# Patient Record
Sex: FEMALE | Race: WHITE | NOT HISPANIC OR LATINO | Employment: FULL TIME | URBAN - METROPOLITAN AREA
[De-identification: names, ages, dates, MRNs, and addresses within clinical notes are randomized per-mention and may not be internally consistent; named-entity substitution may affect disease eponyms.]

---

## 2024-11-13 ENCOUNTER — PATIENT MESSAGE (OUTPATIENT)
Dept: SLEEP MEDICINE | Facility: HOSPITAL | Age: 29
End: 2024-11-13
Payer: COMMERCIAL

## 2024-11-14 ENCOUNTER — HOSPITAL ENCOUNTER (OUTPATIENT)
Dept: SLEEP MEDICINE | Facility: HOSPITAL | Age: 29
Discharge: HOME | End: 2024-11-14
Attending: PSYCHIATRY & NEUROLOGY
Payer: COMMERCIAL

## 2024-11-14 DIAGNOSIS — G25.3 MYOCLONIA: ICD-10-CM

## 2024-11-14 PROCEDURE — 95810 POLYSOM 6/> YRS 4/> PARAM: CPT

## 2024-11-15 PROBLEM — G25.3 MYOCLONIA: Status: ACTIVE | Noted: 2024-11-15

## 2024-12-03 ENCOUNTER — TELEPHONE (OUTPATIENT)
Dept: PULMONOLOGY | Facility: CLINIC | Age: 29
End: 2024-12-03
Payer: COMMERCIAL

## 2024-12-05 NOTE — PROGRESS NOTES
Pulmonary Outpatient  Visit     Subjective:       Patient ID: Tabby Harris is a 29 y.o. female.    Social History     Tobacco Use   Smoking Status Never   Smokeless Tobacco Never            Chief Complaint: sleep results          Tabby Harris is 29 y.o.  Referred by Dr Zheng  Spouse present for visit   Study reviewed with patient   Study was negative for obstructive sleep apnea   Some slight arousals post arousals were seen during sleep throughout the night   Longstanding history reviewed with the patient   Beulah sleepiness score 5   Bedtime 9:00 p.m. wake-up time 6:20 a.m.   Works as a teacher at [employer]   Longstanding history of grand mal seizure as a child  As an adult myoclonic seizures   Treated with Keppra and was often 2019   Recent repeated episodes of what looked like myoclonic jerks   Sisters obstructive sleep apnea the   The were concerned about sleep apnea inpatient   Sleep latency 10-15 minutes   Denies sleep attacks   Denies any signs later in the narcolepsy   Denies any teeth grinding   Finds that the jerking activities are worse with stress caffeine  Naps for 120-180 minutes   Noteworthy stress   Patient attests to have mild snoring or nocturnal diaphoresis   Occasional dry mouth at night   Denies any daytime sleepiness              Review of Systems   Constitutional: Negative.    Respiratory:  Negative for apnea, snoring, shortness of breath, dyspnea on extertion and somnolence.        Outpatient Encounter Medications as of 12/6/2024   Medication Sig Dispense Refill    citalopram (CELEXA) 10 MG tablet Take 10 mg by mouth once daily.      hydrOXYzine HCL (ATARAX) 10 MG Tab       levonorgestreL (KYLEENA) 17.5 mcg/24 hrs (5 yrs) 19.5 mg IUD 1 each by Intrauterine route once.      methocarbamoL (ROBAXIN) 500 MG Tab Take 500 mg by mouth 4 (four) times daily as needed.      topiramate (TOPAMAX) 50 MG tablet Take 1 at bedtime for 1 week, then take 1  twice a day 60 tablet 5     No facility-administered encounter medications on file as of 12/6/2024.           Pertinent Work Up:      Pulmonary Interventions:      Smoking hx:    Environmental/Occupational hx:        Interval Hx:           The following portions of the patient's history were reviewed and updated as appropriate: She  has a past medical history of Family history of breast cancer, Increased risk of breast cancer, and Seizures.  She does not have any pertinent problems on file.  She  has a past surgical history that includes Colonoscopy (03/2021) and Intrauterine device insertion (10/06/2022).  Her family history includes Breast cancer in her maternal grandmother; Diabetes in her paternal grandfather; Graves' disease in her sister; Lung cancer in her maternal grandfather, paternal grandfather, and paternal grandmother.  She  reports that she has never smoked. She has never used smokeless tobacco. She reports current alcohol use. She reports that she does not use drugs.  She has a current medication list which includes the following prescription(s): citalopram, hydroxyzine hcl, kyleena, methocarbamol, and topiramate.  Current Outpatient Medications on File Prior to Visit   Medication Sig Dispense Refill    citalopram (CELEXA) 10 MG tablet Take 10 mg by mouth once daily.      hydrOXYzine HCL (ATARAX) 10 MG Tab       levonorgestreL (KYLEENA) 17.5 mcg/24 hrs (5 yrs) 19.5 mg IUD 1 each by Intrauterine route once.      methocarbamoL (ROBAXIN) 500 MG Tab Take 500 mg by mouth 4 (four) times daily as needed.      topiramate (TOPAMAX) 50 MG tablet Take 1 at bedtime for 1 week, then take 1 twice a day 60 tablet 5     No current facility-administered medications on file prior to visit.     She is allergic to sulfa (sulfonamide antibiotics)..      BP Readings from Last 3 Encounters:   12/06/24 118/66   11/06/24 128/70   09/25/24 116/74            12/5/2024    11:11 AM   EPWORTH SLEEPINESS SCALE   Sitting and reading  "1    Watching TV 1    Sitting, inactive in a public place (e.g. a theatre or a meeting) 0    As a passenger in a car for an hour without a break 2    Lying down to rest in the afternoon when circumstances permit 1    Sitting and talking to someone 0    Sitting quietly after a lunch without alcohol 0    In a car, while stopped for a few minutes in traffic 0    Total score 5        Patient-reported             MMRC Dyspnea Scale (4 is worst)     [] MMRC 0: Dyspneic on strenuous excercise (0 points)    [] MMRC 1: Dyspneic on walking a slight hill (0 points)    [] MMRC 2: Dyspneic on walking level ground; must stop occasionally due to breathlessness (1 point)    [] MMRC 3: Must stop for breathlessness after walking 100 yards or after a few minutes (2 points)    [] MMRC 4: Cannot leave house; breathless on dressing/undressing (3 points)                          No data to display                          Objective:     Vital Signs (Most Recent)  Vital Signs  Pulse: 88  Resp: 17  SpO2: 99 %  BP: 118/66  Pain Score: 0-No pain  Height and Weight  Height: 5' 6" (167.6 cm)  Weight: 73.4 kg (161 lb 13.1 oz)  BSA (Calculated - sq m): 1.85 sq meters  BMI (Calculated): 26.1  Weight in (lb) to have BMI = 25: 154.6]  Wt Readings from Last 2 Encounters:   12/06/24 73.4 kg (161 lb 13.1 oz)   11/06/24 74.4 kg (164 lb)       Physical Exam  Vitals and nursing note reviewed.   Constitutional:       Appearance: She is normal weight.   HENT:      Head: Normocephalic and atraumatic.      Nose: Nose normal.   Eyes:      Pupils: Pupils are equal, round, and reactive to light.   Cardiovascular:      Rate and Rhythm: Normal rate and regular rhythm.      Pulses: Normal pulses.      Heart sounds: Normal heart sounds.   Pulmonary:      Effort: Pulmonary effort is normal.      Breath sounds: Normal breath sounds.   Abdominal:      General: Bowel sounds are normal.      Palpations: Abdomen is soft.   Musculoskeletal:      Cervical back: Normal range " "of motion.   Skin:     General: Skin is warm.   Neurological:      General: No focal deficit present.      Mental Status: She is alert and oriented to person, place, and time.   Psychiatric:         Mood and Affect: Mood normal.          Laboratory  Lab Results   Component Value Date    WBC 7.0 09/25/2024    RBC 4.37 09/25/2024    HGB 13.6 09/25/2024    HCT 40.4 09/25/2024    MCV 92 09/25/2024    MCH 31.1 09/25/2024    MCHC 33.7 09/25/2024    RDW 11.7 09/25/2024     09/25/2024    LYMPH 2.7 09/25/2024    MONO 9 09/25/2024    MONO 0.7 09/25/2024    EOS 0.2 09/25/2024    BASO 0.0 09/25/2024    EOSINOPHIL 3 09/25/2024    BASOPHIL 0 09/25/2024       BMP  Lab Results   Component Value Date     09/25/2024    K 3.8 09/25/2024     09/25/2024    CO2 21 09/25/2024    BUN 8 09/25/2024    CREATININE 0.71 09/25/2024    CALCIUM 9.5 09/25/2024    AST 20 09/25/2024    ALT 17 09/25/2024          No results found for: "IGE"     No results found for: "ASPERGILLUS"  No results found for: "AFUMIGATUSCL"     No results found for: "ACE"     Diagnostic Results:  I have personally reviewed today the following studies:    No image results found.           Assessment/Plan:        Discussed with patient   The activities that she is describing if not related to underlying seizure disorder would meet the description for hip neck jerks   Patient has an EEG performed in September October was reading is pending   Ask patient to forward to me her EEG results   If patient is able to obtain a video observation of these activities which were not evident during the sleep lab testing she was send them to me   Sleep hygiene   Sleep extension   Avoid stressors   Nonpharmacological interventions to improve sleep       1. Hypnic jerks  Assessment & Plan:  Reassurance  Will try obtain video clip        2. Myoclonia  Assessment & Plan:  Follow up with neurology   EEG pending                Follow up if symptoms worsen or fail to " improve.    This note was prepared using voice recognition system and is likely to have sound alike errors that may have been overlooked even after proof reading.  Please call me with any questions    Discussed diagnosis, its evaluation, treatment and usual course. All questions answered.    The patient was given open opportunity to ask questions and/or express concerns about treatment plan.   All questions/concerns were discussed.       Two patient identifiers used prior to evaluation.     Thank you for the courtesy of participating in the care of this patient    Robinson Bustos MD      Personal Diagnostic Review  []  CXR    []  ECHO    []  ONSAT    []  6MWD    []  LABS    []  CHEST CT    []  PET CT    []  Biopsy results

## 2024-12-06 ENCOUNTER — OFFICE VISIT (OUTPATIENT)
Dept: PULMONOLOGY | Facility: CLINIC | Age: 29
End: 2024-12-06
Payer: COMMERCIAL

## 2024-12-06 VITALS
SYSTOLIC BLOOD PRESSURE: 118 MMHG | RESPIRATION RATE: 17 BRPM | WEIGHT: 161.81 LBS | OXYGEN SATURATION: 99 % | DIASTOLIC BLOOD PRESSURE: 66 MMHG | HEART RATE: 88 BPM | BODY MASS INDEX: 26.01 KG/M2 | HEIGHT: 66 IN

## 2024-12-06 DIAGNOSIS — G25.3 MYOCLONIA: ICD-10-CM

## 2024-12-06 DIAGNOSIS — F51.8 HYPNIC JERKS: Primary | ICD-10-CM

## 2024-12-06 PROCEDURE — 99999 PR PBB SHADOW E&M-EST. PATIENT-LVL IV: CPT | Mod: PBBFAC,,, | Performed by: INTERNAL MEDICINE

## 2024-12-06 NOTE — PATIENT INSTRUCTIONS
"A hypnic jerk, hypnagogic jerk, sleep start, sleep twitch, myoclonic jerk, or night start is a brief and sudden involuntary contraction of the muscles of the body which occurs when a person is beginning to fall asleep, often causing the person to jump and awaken suddenly for a moment. Hypnic jerks are one form of involuntary muscle twitches called myoclonus.  Physically, hypnic jerks resemble the "jump" experienced by a person when startled, sometimes accompanied by a falling sensation.[1] Hypnic jerks are associated with a rapid heartbeat, quickened breathing, sweat, and sometimes "a peculiar sensory feeling of 'shock' or 'falling into the void'".[2] It can also be accompanied by a vivid dream experience or hallucination.[3] A higher occurrence is reported in people with irregular sleep schedules.[4] When they are particularly frequent and severe, hypnic jerks have been reported as a cause of sleep-onset insomnia.[3]  Hypnic jerks are common physiological phenomena.[5] Around 70% of people experience them at least once in their lives with 10% experiencing them daily.[6][7] They are benign and do not cause any neurological sequelae.[7]  "